# Patient Record
Sex: FEMALE | URBAN - METROPOLITAN AREA
[De-identification: names, ages, dates, MRNs, and addresses within clinical notes are randomized per-mention and may not be internally consistent; named-entity substitution may affect disease eponyms.]

---

## 2023-06-02 ENCOUNTER — TELEPHONE ENCOUNTER (OUTPATIENT)
Dept: URBAN - METROPOLITAN AREA CLINIC 72 | Facility: CLINIC | Age: 28
End: 2023-06-02

## 2023-07-05 ENCOUNTER — OFFICE VISIT (OUTPATIENT)
Dept: URBAN - METROPOLITAN AREA CLINIC 72 | Facility: CLINIC | Age: 28
End: 2023-07-05

## 2023-08-11 ENCOUNTER — OFFICE VISIT (OUTPATIENT)
Dept: URBAN - METROPOLITAN AREA CLINIC 72 | Facility: CLINIC | Age: 28
End: 2023-08-11

## 2023-08-23 ENCOUNTER — LAB OUTSIDE AN ENCOUNTER (OUTPATIENT)
Dept: URBAN - METROPOLITAN AREA CLINIC 72 | Facility: CLINIC | Age: 28
End: 2023-08-23

## 2023-08-23 ENCOUNTER — OFFICE VISIT (OUTPATIENT)
Dept: URBAN - METROPOLITAN AREA CLINIC 72 | Facility: CLINIC | Age: 28
End: 2023-08-23
Payer: SELF-PAY

## 2023-08-23 VITALS
SYSTOLIC BLOOD PRESSURE: 138 MMHG | WEIGHT: 230.4 LBS | HEIGHT: 63 IN | DIASTOLIC BLOOD PRESSURE: 74 MMHG | BODY MASS INDEX: 40.82 KG/M2 | HEART RATE: 99 BPM | TEMPERATURE: 97.3 F

## 2023-08-23 DIAGNOSIS — K21.9 ACID REFLUX: ICD-10-CM

## 2023-08-23 DIAGNOSIS — K62.5 BRIGHT RED BLOOD PER RECTUM: ICD-10-CM

## 2023-08-23 DIAGNOSIS — K59.09 CHANGE IN BOWEL MOVEMENTS INTERMITTENT CONSTIPATION. URGENCY IN THE MORNING.: ICD-10-CM

## 2023-08-23 PROBLEM — 266435005: Status: ACTIVE | Noted: 2023-08-23

## 2023-08-23 PROBLEM — 14760008: Status: ACTIVE | Noted: 2023-08-23

## 2023-08-23 PROCEDURE — 99204 OFFICE O/P NEW MOD 45 MIN: CPT | Performed by: INTERNAL MEDICINE

## 2023-08-23 RX ORDER — OMEPRAZOLE 20 MG/1
1 TABLET 30 MINUTES BEFORE MORNING AND EVENING MEAL TABLET, DELAYED RELEASE ORAL TWICE A DAY
Qty: 60 | Refills: 1 | OUTPATIENT
Start: 2023-08-23

## 2023-08-23 NOTE — HPI-TODAY'S VISIT:
27-year-old female new to the clinic referred by Dr. Hanks for GERD.  Patient is also experiencing constipation.   A copy of this note will be sent to the referring provider.  Labs 1/18/2023.  H. pylori breath test negative.  Has heartburn at night and in the morning.  Pain is intermittent.  Pain LUQ and constipation.  Tried antacids for a few weeks-was prescribed something by Dr. Hanks but it didn't help.  Stools are hard.  Tried gentle laxative.  Last BM was 1 week.  No melena, some BRBPR when she is constipated.  Was told she had hemorrhoids.  When she eats at night is hurts in her abdomen and back.  No weight loss.   Denies NSAID use.  No CP, SOB, palpitations, syncope, near-syncope or problems with anesthesia previously.   LMP:  Currently  No prior EGD or colonoscopy.

## 2023-09-14 ENCOUNTER — ERX REFILL RESPONSE (OUTPATIENT)
Dept: URBAN - METROPOLITAN AREA CLINIC 72 | Facility: CLINIC | Age: 28
End: 2023-09-14

## 2023-09-14 RX ORDER — OMEPRAZOLE 20 MG/1
1 TABLET 30 MINUTES BEFORE MORNING AND EVENING MEAL TABLET, DELAYED RELEASE ORAL TWICE A DAY
Qty: 60 | Refills: 1 | OUTPATIENT

## 2023-09-14 RX ORDER — OMEPRAZOLE 20 MG/1
TAKE 1 CAPSULE BY MOUTH EVERY MORNING AND EVERY EVENING 30 MINUTES BEFORE MEALS CAPSULE, DELAYED RELEASE ORAL
Qty: 60 CAPSULE | Refills: 1 | OUTPATIENT

## 2023-09-20 ENCOUNTER — OFFICE VISIT (OUTPATIENT)
Dept: URBAN - METROPOLITAN AREA CLINIC 72 | Facility: CLINIC | Age: 28
End: 2023-09-20

## 2023-10-10 ENCOUNTER — ERX REFILL RESPONSE (OUTPATIENT)
Dept: URBAN - METROPOLITAN AREA CLINIC 72 | Facility: CLINIC | Age: 28
End: 2023-10-10

## 2023-10-10 ENCOUNTER — OFFICE VISIT (OUTPATIENT)
Dept: URBAN - METROPOLITAN AREA CLINIC 72 | Facility: CLINIC | Age: 28
End: 2023-10-10
Payer: SELF-PAY

## 2023-10-10 ENCOUNTER — DASHBOARD ENCOUNTERS (OUTPATIENT)
Age: 28
End: 2023-10-10

## 2023-10-10 VITALS
HEIGHT: 63 IN | DIASTOLIC BLOOD PRESSURE: 83 MMHG | TEMPERATURE: 97.1 F | SYSTOLIC BLOOD PRESSURE: 119 MMHG | BODY MASS INDEX: 41.11 KG/M2 | HEART RATE: 95 BPM | WEIGHT: 232 LBS

## 2023-10-10 DIAGNOSIS — K21.9 ACID REFLUX: ICD-10-CM

## 2023-10-10 DIAGNOSIS — R10.12 LEFT UPPER QUADRANT ABDOMINAL PAIN: ICD-10-CM

## 2023-10-10 PROCEDURE — 99214 OFFICE O/P EST MOD 30 MIN: CPT | Performed by: INTERNAL MEDICINE

## 2023-10-10 RX ORDER — OMEPRAZOLE 20 MG/1
TAKE 1 CAPSULE BY MOUTH EVERY MORNING AND EVERY EVENING 30 MINUTES BEFORE MEALS CAPSULE, DELAYED RELEASE ORAL
Qty: 180 CAPSULE | Refills: 1 | OUTPATIENT

## 2023-10-10 RX ORDER — SUCRALFATE 1 G/1
1 TABLET ON AN EMPTY STOMACH TABLET ORAL
Qty: 100 | Refills: 2 | OUTPATIENT
Start: 2023-10-10 | End: 2024-01-07

## 2023-10-10 RX ORDER — OMEPRAZOLE 20 MG/1
TAKE 1 CAPSULE BY MOUTH EVERY MORNING AND EVERY EVENING 30 MINUTES BEFORE MEALS CAPSULE, DELAYED RELEASE ORAL
Qty: 180 CAPSULE | Refills: 1 | Status: ACTIVE | COMMUNITY

## 2023-10-10 RX ORDER — OMEPRAZOLE 20 MG/1
1 TABLET 30 MINUTES BEFORE MORNING AND EVENING MEAL TABLET, DELAYED RELEASE ORAL TWICE A DAY
Qty: 60 | Refills: 1 | OUTPATIENT

## 2023-10-10 RX ORDER — OMEPRAZOLE 20 MG/1
TAKE 1 CAPSULE BY MOUTH EVERY MORNING AND EVERY EVENING 30 MINUTES BEFORE MEALS CAPSULE, DELAYED RELEASE ORAL
Qty: 60 CAPSULE | Refills: 1 | OUTPATIENT

## 2023-10-10 NOTE — HPI-TODAY'S VISIT:
27-year-old female here for a 1-month follow-up appointment.    Last seen 8/23/2023 for constipation and acid reflux. Started on omeprazole 20 mg twice a day. Labs ordered for further evaluation. Consider imaging. Advised to increase her fiber intake and use Preparation H as needed for hemorrhoids.  Colonoscopy scheduled for 11/20/2023, EGD scheduled for 12/18/2023.  She reports her heartburn improved for 3 weeks but has since returned. She notices left upper quadrant pain when heartburn occurs.  Constipation and bright red blood per rectum has resolved with fiber supplement. Weight is up 2 pounds from her last appointment.    LMP: Denies pregnancy.    No prior EGD or colonoscopy.

## 2023-10-10 NOTE — HPI-TODAY'S VISIT:
Labs: -1/18/2023. H. pylori breath test negative. -10/9/23.  TSH 5.76. Normal Free T4.  CBC normal with Hgb 12.8.  CMP: Glucose 120.  C02 18. Lipase normal

## 2023-10-19 ENCOUNTER — TELEPHONE ENCOUNTER (OUTPATIENT)
Dept: URBAN - METROPOLITAN AREA CLINIC 113 | Facility: CLINIC | Age: 28
End: 2023-10-19

## 2023-11-15 ENCOUNTER — OFFICE VISIT (OUTPATIENT)
Dept: URBAN - METROPOLITAN AREA SURGERY CENTER 25 | Facility: SURGERY CENTER | Age: 28
End: 2023-11-15

## 2023-11-20 ENCOUNTER — OFFICE VISIT (OUTPATIENT)
Dept: URBAN - METROPOLITAN AREA MEDICAL CENTER 40 | Facility: MEDICAL CENTER | Age: 28
End: 2023-11-20

## 2023-12-08 ENCOUNTER — OFFICE VISIT (OUTPATIENT)
Dept: URBAN - METROPOLITAN AREA SURGERY CENTER 25 | Facility: SURGERY CENTER | Age: 28
End: 2023-12-08

## 2023-12-18 ENCOUNTER — ERX REFILL RESPONSE (OUTPATIENT)
Dept: URBAN - METROPOLITAN AREA CLINIC 72 | Facility: CLINIC | Age: 28
End: 2023-12-18

## 2023-12-18 ENCOUNTER — OFFICE VISIT (OUTPATIENT)
Dept: URBAN - METROPOLITAN AREA MEDICAL CENTER 40 | Facility: MEDICAL CENTER | Age: 28
End: 2023-12-18

## 2023-12-18 RX ORDER — SUCRALFATE 1 G/1
1 TABLET ON AN EMPTY STOMACH. ORAL 1 HOUR BEFORE MEALS AND AT BEDTIME 30 DAYS TABLET ORAL
Qty: 300 TABLET | Refills: 0 | OUTPATIENT

## 2023-12-18 RX ORDER — SUCRALFATE 1 G/1
1 TABLET ON AN EMPTY STOMACH TABLET ORAL
Qty: 100 | Refills: 2 | OUTPATIENT

## 2024-01-02 ENCOUNTER — ERX REFILL RESPONSE (OUTPATIENT)
Dept: URBAN - METROPOLITAN AREA CLINIC 72 | Facility: CLINIC | Age: 29
End: 2024-01-02

## 2024-01-02 RX ORDER — SUCRALFATE 1 G/1
TAKE ONE TAB BY MOUTH ON AN EMPTY STOMACH 1 HOUR BEFORE MEALS & AT BEDTIME TABLET ORAL
Qty: 360 TABLET | Refills: 1 | OUTPATIENT

## 2024-01-02 RX ORDER — SUCRALFATE 1 G/1
1 TABLET ON AN EMPTY STOMACH. ORAL 1 HOUR BEFORE MEALS AND AT BEDTIME 30 DAYS TABLET ORAL
Qty: 300 TABLET | Refills: 0 | OUTPATIENT

## 2024-02-09 ENCOUNTER — EGD (OUTPATIENT)
Dept: URBAN - METROPOLITAN AREA SURGERY CENTER 25 | Facility: SURGERY CENTER | Age: 29
End: 2024-02-09

## 2024-02-23 ENCOUNTER — OV EP (OUTPATIENT)
Dept: URBAN - METROPOLITAN AREA CLINIC 72 | Facility: CLINIC | Age: 29
End: 2024-02-23